# Patient Record
Sex: FEMALE | HISPANIC OR LATINO
[De-identification: names, ages, dates, MRNs, and addresses within clinical notes are randomized per-mention and may not be internally consistent; named-entity substitution may affect disease eponyms.]

---

## 2018-12-30 PROBLEM — Z00.00 ENCOUNTER FOR PREVENTIVE HEALTH EXAMINATION: Status: ACTIVE | Noted: 2018-12-30

## 2019-01-25 ENCOUNTER — RECORD ABSTRACTING (OUTPATIENT)
Age: 39
End: 2019-01-25

## 2019-01-25 DIAGNOSIS — N94.6 DYSMENORRHEA, UNSPECIFIED: ICD-10-CM

## 2019-01-29 ENCOUNTER — RESULT CHARGE (OUTPATIENT)
Age: 39
End: 2019-01-29

## 2019-01-29 ENCOUNTER — APPOINTMENT (OUTPATIENT)
Dept: OBGYN | Facility: CLINIC | Age: 39
End: 2019-01-29
Payer: COMMERCIAL

## 2019-01-29 VITALS
BODY MASS INDEX: 28 KG/M2 | WEIGHT: 158 LBS | DIASTOLIC BLOOD PRESSURE: 70 MMHG | HEIGHT: 63 IN | SYSTOLIC BLOOD PRESSURE: 120 MMHG

## 2019-01-29 DIAGNOSIS — Z12.4 ENCOUNTER FOR SCREENING FOR MALIGNANT NEOPLASM OF CERVIX: ICD-10-CM

## 2019-01-29 DIAGNOSIS — Z01.419 ENCOUNTER FOR GYNECOLOGICAL EXAMINATION (GENERAL) (ROUTINE) W/OUT ABNORMAL FINDINGS: ICD-10-CM

## 2019-01-29 LAB
BILIRUB UR QL STRIP: NORMAL
CLARITY UR: CLEAR
COLLECTION METHOD: NORMAL
GLUCOSE UR-MCNC: NORMAL
HCG UR QL: 0.2 EU/DL
HCG UR QL: NEGATIVE
HGB UR QL STRIP.AUTO: ABNORMAL
KETONES UR-MCNC: NORMAL
LEUKOCYTE ESTERASE UR QL STRIP: NORMAL
NITRITE UR QL STRIP: NORMAL
PH UR STRIP: 7
PROT UR STRIP-MCNC: NORMAL
QUALITY CONTROL: YES
SP GR UR STRIP: 1.02

## 2019-01-29 PROCEDURE — 81003 URINALYSIS AUTO W/O SCOPE: CPT | Mod: QW

## 2019-01-29 PROCEDURE — 81025 URINE PREGNANCY TEST: CPT

## 2019-01-29 PROCEDURE — 99385 PREV VISIT NEW AGE 18-39: CPT

## 2019-01-30 LAB — HPV HIGH+LOW RISK DNA PNL CVX: NOT DETECTED

## 2019-01-30 NOTE — PHYSICAL EXAM
[Awake] : awake [Alert] : alert [Soft] : soft [Oriented x3] : oriented to person, place, and time [Labia Majora] : labia major [Labia Minora] : labia minora [Normal] : clitoris [No Bleeding] : there was no active vaginal bleeding [Uterine Adnexae] : were not tender and not enlarged [Acute Distress] : no acute distress [Mass] : no breast mass [Nipple Discharge] : no nipple discharge [Axillary LAD] : no axillary lymphadenopathy [Tender] : non tender [FreeTextEntry5] :  WNL,appears, cervix appearance is most likely due to trauma/torn during .  [FreeTextEntry6] : normal

## 2019-01-30 NOTE — DISCUSSION/SUMMARY
[de-identified] : Pt stating  has issues with sperm, unsure of dx.  [FreeTextEntry1] : Pt to RTO annual yearly.  All the pt's questions and concerns were addressed.

## 2019-02-04 LAB — CYTOLOGY CVX/VAG DOC THIN PREP: NORMAL

## 2020-02-05 ENCOUNTER — APPOINTMENT (OUTPATIENT)
Dept: OBGYN | Facility: CLINIC | Age: 40
End: 2020-02-05
Payer: COMMERCIAL

## 2020-02-05 VITALS
WEIGHT: 156 LBS | BODY MASS INDEX: 25.07 KG/M2 | DIASTOLIC BLOOD PRESSURE: 90 MMHG | HEIGHT: 66 IN | SYSTOLIC BLOOD PRESSURE: 130 MMHG

## 2020-02-05 DIAGNOSIS — N75.1 ABSCESS OF BARTHOLIN'S GLAND: ICD-10-CM

## 2020-02-05 PROCEDURE — 99214 OFFICE O/P EST MOD 30 MIN: CPT | Mod: 25

## 2020-02-05 PROCEDURE — 56420 I&D BARTHOLINS GLAND ABSCESS: CPT

## 2020-02-05 NOTE — HISTORY OF PRESENT ILLNESS
[Good] : being in good health [Healthy Diet] : a healthy diet [Last Pap ___] : Last cervical pap smear was [unfilled] [Reproductive Age] : is of reproductive age [Pregnancy History] : pregnancy history: [Total Preg ___] : [unfilled] [Full Term ___] : [unfilled] [Living ___] : [unfilled] [Definite:  ___ (Date)] : the last menstrual period was [unfilled] [Menarche Age: ____] : age at menarche was [unfilled] [Sexually Active] : is sexually active [Monogamous] : is monogamous [Menstrual Problems] : reports normal menses [Contraception] : does not use contraception

## 2020-02-05 NOTE — PROCEDURE
[Right] : right [I&D] : an I&D was performed [None] : none [Purulent Fluid] : the fluid was purulent [Tolerated Well] : the patient tolerated the procedure well [No Complications] : there were no complications

## 2020-02-05 NOTE — PHYSICAL EXAM
[___] : a [unfilled] ~m abscess [Bartholin's Gland] : the right Bartholin's gland was normal [de-identified] : right Bartholin's gland abcess

## 2020-12-21 PROBLEM — Z01.419 ENCOUNTER FOR ANNUAL ROUTINE GYNECOLOGICAL EXAMINATION: Status: RESOLVED | Noted: 2019-01-29 | Resolved: 2020-12-21

## 2021-11-11 ENCOUNTER — APPOINTMENT (OUTPATIENT)
Dept: OBGYN | Facility: CLINIC | Age: 41
End: 2021-11-11
Payer: SELF-PAY

## 2021-11-11 VITALS
WEIGHT: 160 LBS | SYSTOLIC BLOOD PRESSURE: 115 MMHG | DIASTOLIC BLOOD PRESSURE: 70 MMHG | BODY MASS INDEX: 25.71 KG/M2 | HEIGHT: 66 IN

## 2021-11-11 DIAGNOSIS — N76.4 ABSCESS OF VULVA: ICD-10-CM

## 2021-11-11 PROCEDURE — 56405 I&D VULVA/PERINEAL ABSCESS: CPT

## 2021-11-11 PROCEDURE — 99212 OFFICE O/P EST SF 10 MIN: CPT | Mod: 25

## 2021-11-11 RX ORDER — CEPHALEXIN 500 MG/1
500 CAPSULE ORAL
Qty: 10 | Refills: 0 | Status: ACTIVE | COMMUNITY
Start: 2020-02-05 | End: 1900-01-01

## 2021-11-22 LAB — BACTERIA WND CULT: ABNORMAL

## 2021-12-27 PROBLEM — N76.4 ABSCESS, VULVA: Status: ACTIVE | Noted: 2021-12-27

## 2021-12-27 NOTE — PHYSICAL EXAM
[Appropriately responsive] : appropriately responsive [Alert] : alert [No Acute Distress] : no acute distress [Oriented x3] : oriented x3 [FreeTextEntry1] : Right vulva large abcess noted - I & D preformed.

## 2021-12-27 NOTE — DISCUSSION/SUMMARY
[FreeTextEntry1] : Pt to RTO if not completely resolved in 7 days.  Pt will apply warm soaks qid for next 3-5 days.  Pt aware drainage sent for culture, pending result any further tx.  Call parameters reviewed.  Pt to RTO annual overdue.  All the pt's questions and concerns were addressed.

## 2021-12-27 NOTE — PROCEDURE
[I & D] : I&D [Time out performed] : Pre-procedure time out performed.  Patient's name, date of birth and procedure confirmed [Consent obtained] : Consent obtained [Right] : right [Size: ___cm] : Cyst is ~Vcm [Purulent Fluid] : purulent fluid [Tolerated Well] : The patient tolerated the procedure well [No Complications] : There were no complications

## 2021-12-27 NOTE — HISTORY OF PRESENT ILLNESS
[Patient reported PAP Smear was normal] : Patient reported PAP Smear was normal [N] : Patient does not use contraception [Y] : Positive pregnancy history [Currently Active] : currently active [Men] : men [FreeTextEntry1] : Pt presents today with c/o tender right vulva lesion.  Pt has hx of Bartholin gland abscess.  Pt advised on exam, not Bartholin gland, right vulva abscess, I & D preformed, culture sent of drainage.   [PapSmeardate] : 02/2020 [LMPDate] : 10/12/2021 [PGxTotal] : 1 [[facility]xFulerm] : 1 [[facility] Centeriving] : 1